# Patient Record
Sex: FEMALE | Race: WHITE
[De-identification: names, ages, dates, MRNs, and addresses within clinical notes are randomized per-mention and may not be internally consistent; named-entity substitution may affect disease eponyms.]

---

## 2018-12-09 ENCOUNTER — HOSPITAL ENCOUNTER (EMERGENCY)
Dept: HOSPITAL 25 - ED | Age: 78
Discharge: HOME | End: 2018-12-09
Payer: MEDICARE

## 2018-12-09 VITALS — DIASTOLIC BLOOD PRESSURE: 59 MMHG | SYSTOLIC BLOOD PRESSURE: 157 MMHG

## 2018-12-09 DIAGNOSIS — S42.211A: Primary | ICD-10-CM
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
## 2018-12-09 DIAGNOSIS — I10: ICD-10-CM

## 2018-12-09 DIAGNOSIS — E78.5: ICD-10-CM
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
## 2018-12-09 PROCEDURE — 96372 THER/PROPH/DIAG INJ SC/IM: CPT

## 2018-12-09 PROCEDURE — 99282 EMERGENCY DEPT VISIT SF MDM: CPT

## 2018-12-09 NOTE — ED
Upper Extremity Pain





- HPI Summary


HPI Summary: 


Pt is 77 y/o F who presents to ED c/o right upper extremity pain since 

approximately 2 hours ago. Pt was on a hike with her  when she tripped 

and fell. She denies hitting her head upon the fall. Immediately after the fall 

her arm was not causing her any serious discomfort or pain, and she was able to 

continue her hike for another 2 miles. Then suddenly, she felt severe pain in 

her arm and told her  she was unable to conclude the hike and needed him 

to get the car. Rates her pain an 8/10 in severity at triage. Pain is 

exacerbated with movement. 





- History of Current Complaint


Chief Complaint: EDExtremityUpper


Stated Complaint: RT SHOULDER INJURY


Time Seen by Provider: 12/09/18 11:42


Hx Obtained From: Patient


Mechanism Of Injury: Fall From A Standing Position


Onset/Duration: Started Hours Ago, Still Present


Severity Initially: Mild


Severity Currently: Severe


Pain Location: Shoulder, Arm


Aggravating Factor(s): Movement


Associated Signs & Symptoms: Negative: Fever





- Allergies/Home Medications


Allergies/Adverse Reactions: 


 Allergies











Allergy/AdvReac Type Severity Reaction Status Date / Time


 


No Known Allergies Allergy   Verified 05/03/15 14:43











Home Medications: 


 Home Medications





Atorvastatin* [Lipitor 20 MG*] 20 mg PO DAILY 12/09/18 [History Confirmed 12/09/ 18]


Candesartan (NF) [Atacand (NF)] 1 tab PO DAILY 12/09/18 [History Confirmed 12/09 /18]











PMH/Surg Hx/FS Hx/Imm Hx


Endocrine/Hematology History: Reports: Other Endocrine/Hematological Disorders 

- dyslipidemia


Cardiovascular History: Reports: Hx Hypertension





- Cancer History


Hx Chemotherapy: No


Hx Radiation Therapy: No





- Surgical History


Surgery Procedure, Year, and Place: 3 C-SECTIONS, OVARIAN CYST REMOVED


Infectious Disease History: No


Infectious Disease History: 


   Denies: Traveled Outside the [place] in Last 30 Days





- Family History


Known Family History: Positive: Other - Cancer 


   Negative: Diabetes





- Social History


Alcohol Use: Daily


Substance Use Type: Reports: None


Smoking Status (MU): Never Smoked Tobacco





Review of Systems


Negative: Fever


Positive: Other - upper right extremity pain 


All Other Systems Reviewed And Are Negative: Yes





Physical Exam





- Summary


Physical Exam Summary: 








Appearance: The patient is well-nourished in no acute distress and in no acute 

pain.


Skin: The skin is warm and dry and skin color reflects adequate perfusion.


HEENT: The head is normocephalic and atraumatic. The pupils are equal and 

reactive. The conjunctivae are clear and without drainage. Nares are patent and 

without drainage. Mouth reveals moist mucous membranes and the throat is 

without erythema and exudate. The external ears are intact. The ear canals are 

patent and without drainage. The tympanic membranes are intact.


Neck: The neck is supple with full range of motion and non-tender. There are no 

carotid bruits. There is no neck vein distension. 


Respiratory: Chest is non-tender. Lungs are clear to auscultation and breath 

sounds are symmetrical and equal.


Cardiovascular: Heart is regular rate and rhythm. There is no murmur or rub 

auscultated. There is no peripheral edema and pulses are symmetrical and equal.


Abdomen: The abdomen is soft and non-tender. There are normal bowel sounds 

heard in all four quadrants and there is no organomegaly palpated.


Musculoskeletal: Right proximal humerus is tender to touch and looks swollen. 

Right proximal humerus is tender to any range of motion, active or passive. 

There is no back tenderness noted. There is good capillary refill. There is no 

peripheral edema or calf tenderness elicited.


Neurological: Patient is alert and oriented to person, place and time. The 

patient has symmetrical motor strength in all four extremities. Cranial nerves 

are grossly intact. Deep tendon reflexes are symmetrical and equal in all four 

extremities.


Psychiatric: The patient has an appropriate affect and does not exhibit any 

anxiety or depression.


Triage Information Reviewed: Yes


Vital Signs On Initial Exam: 


 Initial Vitals











Temp Pulse Resp BP Pulse Ox


 


 97.9 F   93   16   171/100   99 


 


 12/09/18 11:43  12/09/18 11:43  12/09/18 11:43  12/09/18 11:43  12/09/18 11:43











Vital Signs Reviewed: Yes





Diagnostics





- Vital Signs


 Vital Signs











  Temp Pulse Resp BP Pulse Ox


 


 12/09/18 12:00    16  


 


 12/09/18 11:43  97.9 F  93  16  171/100  99














- Laboratory


Lab Statement: Any lab studies that have been ordered have been reviewed, and 

results considered in the medical decision making process.





- Radiology


  ** Right Shoulder X-ray


Radiology Interpretation Completed By: Radiologist - REPORT AND IMPRESSION: 

Impacted surgical neck fracture of the humerus with extension to involve the 

greater tuberosity. Associated anterior glenohumeral dislocation. Normal 

acromioclavicular joint alignment. Bone density appears decreased throughout. 

Soft tissue swelling about the shoulder.   ED Physician reviewed this report.





Re-Evaluation





- Re-Evaluation


  ** First Eval


Re-Evaluation Time: 12:25


Change: Improved


Comment: Discussed x-ray results and told to follow up with orthopedics. Pt is 

agreeable with this plan.





Course/Dx





- Course


Course Of Treatment: Ms. Shannon presented a couple hours after a fall with 

severe pain in her right upper arm/shoulder.  She was tender to any range of 

motion, had stable vital signs and her neurovascular and motor were intact 

distally.  She was given IM Dilaudid and taken to x-ray.  X-rays showed a 

comminuted, impacted humeral neck fracture.  I reviewed the films at Dr. Rojas 

and she wants to follow the patient first thing tomorrow morning to discuss 

possible surgery.  In the meantime replacing her in a shoulder immobilizer and 

treated her with pain medications as well as ibuprofen.





- Diagnoses


Provider Diagnoses: 


 Right arm fracture








Discharge





- Sign-Out/Discharge


Documenting (check all that apply): Patient Departure - Discharge 





- Discharge Plan


Condition: Stable


Disposition: HOME


Prescriptions: 


traMADol TAB* [Ultram*] 50 mg PO Q6HR PRN #20 tab MDD 4


 PRN Reason: Pain


traMADol TAB* [Ultram*] 50 mg PO Q6HR PRN #20 tab MDD 4


 PRN Reason: Pain


Patient Education Materials:  Ibuprofen (By mouth), Arm Fracture in Adults (ED)


Referrals: 


Mariela Rojas MD [Medical Doctor] - 2 Days


Additional Instructions: 


Follow up with Dr. Rojas in orthopedics. RETURN TO ED FOR ANY NEW OR WORSENING 

SYMPTOMS. 





- Billing Disposition and Condition


Condition: STABLE


Disposition: Home





- Attestation Statements


Document Initiated by Scribe: Yes


Documenting Scribe: Stephanie Tran


Provider For Whom Vahe is Documenting (Include Credential): Dr. Judson Sullivan MD


Scribe Attestation: 


Stephanie RODRIGUEZ scrinezed for Dr. Judson Sullivan MD on 12/09/18 at 1328. 


Scribe Documentation Reviewed: Yes


Provider Attestation: 


The documentation as recorded by the Stephanie jane accurately 

reflects the service I personally performed and the decisions made by me, Dr. Judson Sullivan MD


Status of Scribe Document: Viewed